# Patient Record
Sex: FEMALE | Race: WHITE | Employment: STUDENT | URBAN - METROPOLITAN AREA
[De-identification: names, ages, dates, MRNs, and addresses within clinical notes are randomized per-mention and may not be internally consistent; named-entity substitution may affect disease eponyms.]

---

## 2017-07-11 PROBLEM — Z30.41 ENCOUNTER FOR SURVEILLANCE OF CONTRACEPTIVE PILLS: Status: ACTIVE | Noted: 2017-07-11

## 2017-07-11 PROBLEM — Z11.3 ENCOUNTER FOR SCREENING FOR INFECTIONS WITH PREDOMINANTLY SEXUAL MODE OF TRANSMISSION: Status: ACTIVE | Noted: 2017-07-11

## 2017-07-11 PROBLEM — F41.8 DEPRESSION WITH ANXIETY: Status: ACTIVE | Noted: 2017-07-11

## 2017-07-11 PROCEDURE — 87591 N.GONORRHOEAE DNA AMP PROB: CPT | Performed by: FAMILY MEDICINE

## 2017-07-11 PROCEDURE — 87491 CHLMYD TRACH DNA AMP PROBE: CPT | Performed by: FAMILY MEDICINE

## 2017-08-10 PROBLEM — H61.23 BILATERAL IMPACTED CERUMEN: Status: ACTIVE | Noted: 2017-08-10

## 2017-08-10 PROBLEM — R11.10 VOMITING: Status: ACTIVE | Noted: 2017-08-10

## 2017-08-10 PROBLEM — J02.0 STREP THROAT: Status: ACTIVE | Noted: 2017-08-10

## 2017-08-29 PROBLEM — J02.0 STREP THROAT: Status: RESOLVED | Noted: 2017-08-10 | Resolved: 2017-08-29

## 2017-08-29 PROBLEM — R53.83 FEELING TIRED: Status: ACTIVE | Noted: 2017-08-29

## 2017-08-29 PROBLEM — H61.23 BILATERAL IMPACTED CERUMEN: Status: RESOLVED | Noted: 2017-08-10 | Resolved: 2017-08-29

## 2017-08-29 PROBLEM — B27.90 MONONUCLEOSIS: Status: ACTIVE | Noted: 2017-08-29

## 2017-08-29 PROBLEM — R11.10 VOMITING: Status: RESOLVED | Noted: 2017-08-10 | Resolved: 2017-08-29

## 2018-01-18 PROBLEM — J35.01 CHRONIC TONSILLITIS: Status: ACTIVE | Noted: 2018-01-18

## 2018-01-18 PROBLEM — R10.12 LEFT UPPER QUADRANT PAIN: Status: ACTIVE | Noted: 2018-01-18

## 2018-01-18 PROBLEM — B27.90 MONONUCLEOSIS: Status: RESOLVED | Noted: 2017-08-29 | Resolved: 2018-01-18

## 2018-05-24 PROBLEM — R10.12 LEFT UPPER QUADRANT PAIN: Status: RESOLVED | Noted: 2018-01-18 | Resolved: 2018-05-24

## 2018-05-24 PROBLEM — Z11.3 ENCOUNTER FOR SCREENING FOR INFECTIONS WITH PREDOMINANTLY SEXUAL MODE OF TRANSMISSION: Status: RESOLVED | Noted: 2017-07-11 | Resolved: 2018-05-24

## 2018-05-24 PROBLEM — Z11.3 SCREEN FOR STD (SEXUALLY TRANSMITTED DISEASE): Status: ACTIVE | Noted: 2017-07-11

## 2018-05-24 PROBLEM — J35.01 CHRONIC TONSILLITIS: Status: RESOLVED | Noted: 2018-01-18 | Resolved: 2018-05-24

## 2018-05-24 PROBLEM — R53.83 FEELING TIRED: Status: RESOLVED | Noted: 2017-08-29 | Resolved: 2018-05-24

## 2018-05-24 PROBLEM — J45.40 MODERATE PERSISTENT ASTHMA WITHOUT COMPLICATION: Status: ACTIVE | Noted: 2018-05-24

## 2018-05-24 PROCEDURE — 87491 CHLMYD TRACH DNA AMP PROBE: CPT | Performed by: FAMILY MEDICINE

## 2018-05-24 PROCEDURE — 87591 N.GONORRHOEAE DNA AMP PROB: CPT | Performed by: FAMILY MEDICINE

## 2019-01-29 PROBLEM — J45.20 MILD INTERMITTENT ASTHMA WITHOUT COMPLICATION: Status: ACTIVE | Noted: 2018-05-24

## 2019-01-29 PROBLEM — Z11.3 SCREEN FOR STD (SEXUALLY TRANSMITTED DISEASE): Status: RESOLVED | Noted: 2017-07-11 | Resolved: 2019-01-29

## 2019-01-29 PROBLEM — F41.8 DEPRESSION WITH ANXIETY: Status: RESOLVED | Noted: 2017-07-11 | Resolved: 2019-01-29

## 2019-01-29 PROBLEM — Z30.41 ENCOUNTER FOR SURVEILLANCE OF CONTRACEPTIVE PILLS: Status: RESOLVED | Noted: 2017-07-11 | Resolved: 2019-01-29

## 2020-02-21 ENCOUNTER — OFFICE VISIT (OUTPATIENT)
Dept: FAMILY MEDICINE | Facility: CLINIC | Age: 25
End: 2020-02-21
Payer: COMMERCIAL

## 2020-02-21 VITALS
SYSTOLIC BLOOD PRESSURE: 115 MMHG | WEIGHT: 147.5 LBS | BODY MASS INDEX: 24.57 KG/M2 | HEART RATE: 75 BPM | RESPIRATION RATE: 20 BRPM | TEMPERATURE: 98.1 F | DIASTOLIC BLOOD PRESSURE: 71 MMHG | HEIGHT: 65 IN | OXYGEN SATURATION: 98 %

## 2020-02-21 DIAGNOSIS — T14.8XXA PUNCTURE WOUND: Primary | ICD-10-CM

## 2020-02-21 PROCEDURE — 99213 OFFICE O/P EST LOW 20 MIN: CPT | Performed by: PHYSICIAN ASSISTANT

## 2020-02-21 RX ORDER — CEPHALEXIN 500 MG/1
500 CAPSULE ORAL 4 TIMES DAILY
Qty: 40 CAPSULE | Refills: 0 | Status: SHIPPED | OUTPATIENT
Start: 2020-02-21 | End: 2020-03-02

## 2020-02-21 SDOH — HEALTH STABILITY: MENTAL HEALTH: HOW OFTEN DO YOU HAVE A DRINK CONTAINING ALCOHOL?: NEVER

## 2020-02-21 ASSESSMENT — MIFFLIN-ST. JEOR: SCORE: 1414.94

## 2020-02-21 NOTE — PROGRESS NOTES
"SUBJECTIVE:  Cynthia is a 24 year old female who presents to urgent care with an injury of her right forearm.  She was punctured by a piece of metal while working today at approximately 9:00 AM.  This happened at work up in [place] and she is at the [employer] to shop.  They initially cleaned the wound and covered it with a Band-Aid.  It has been minimally bleeding off and on since then but overall is clean, not actively bleeding and minimally tender.     Chief Complaint   Patient presents with     Musculoskeletal Problem       ROS: See HPI    No current outpatient medications on file.     No current facility-administered medications for this visit.       There is no problem list on file for this patient.       No past medical history on file.  No past surgical history on file.  No family history on file.  Social History     Tobacco Use     Smoking status: Never Smoker     Smokeless tobacco: Never Used   Substance Use Topics     Alcohol use: Never     Frequency: Never        OBJECTIVE:  /71   Pulse 75   Temp 98.1  F (36.7  C) (Oral)   Resp 20   Ht 1.651 m (5' 5\")   Wt 66.9 kg (147 lb 8 oz)   SpO2 98%   BMI 24.55 kg/m       GENERAL APPEARANCE: healthy, alert and no distress     EYES: EOMI     NECK: no adenopathy, no asymmetry, masses, or scars and thyroid normal to palpation     RESP: No respiratory distress     SKIN: 1 mm wide by 5 mm long minimally avulsed puncture wound less than 6 mm deep.  Minimal bleeding with palpation, no foreign body or dirt matter.  Achieved good wound edge approximation with Steri-Strips.     NEURO: Right upper extremity distal to th puncture wound is neurovascularly intact, full sensation and strength.     PSYCH: mentation appears normal. and affect normal/bright     LYMPHATICS: No axillary, cervical, inguinal, or supraclavicular nodes    No results found for any visits on 02/21/20.     ASSESSMENT/PLAN:  Cynthia was seen today for musculoskeletal problem.    Diagnoses " and all orders for this visit:    Puncture wound  -     cephALEXin 500 MG PO capsule; Take 1 capsule (500 mg) by mouth 4 times daily for 10 days    Wound was assessed and then cleaned with chlorhexidine.  Unable to find saline for flushing.  Wound was minimally avulsed and less than 1/4 inch deep and we discussed Steri-Strips would be an appropriate option and minimally invasive.  2 Steri-Strips and counter tension were used to approximate the wound edges.  Topical benzoin was used to increase adhesiveness.  Wound had achieved hemostasis and gauze was placed on wound and then wrapped with Coban.  Discussed to keep the wound wrapped until tomorrow and reassess.  If Steri-Strips are maintaining good wound approximation with no dehiscence rewrapped and continue wound care.  If Steri-Strips have not been effective and wound has dehiscence patient will follow-up with primary care provider for 24 hours of  when injury occurred for further management.  Overall I think the wound is low risk for infection.  is not significantly deep, no foreign matter and not dirty.  However we were able to flush the wound and discussed that prophylactically Keflex may prevent infection but does have side effects.  Discussed the side effects versus risks and benefits.  Advised if she does not start the antibiotic today for prophylactic prevention if she experiences any redness, purulence or swelling she should start the antibiotic right away and follow-up with primary care provider.    - Patient was called 3 times and left a detailed voice message to assess tetanus status.  If she has not received a tetanus shot in the last 5 years it is recommended she follow-up and get a tetanus shot.    Baljeet Alicia PA-C